# Patient Record
(demographics unavailable — no encounter records)

---

## 2025-05-28 NOTE — DATA REVIEWED
[de-identified] : Type As leah, AU. Testing via inserts:  - 8000Hz AU. Recs: 1) F/u w/ MD 2) Annual

## 2025-05-28 NOTE — REASON FOR VISIT
[Subsequent Evaluation] : a subsequent evaluation for [FreeTextEntry2] : f/u for left ear  infection

## 2025-05-28 NOTE — PROCEDURE
[Risk and Benefits Discussed] : The purpose, risks, discomforts, benefits and alternatives of the procedure have been explained to the patient including no treatment. [Same] : same as the Pre Op Dx. [] : Binocular Microscopy [FreeTextEntry1] : Follow up left OE with debris in ear canal - improved from last visit  [FreeTextEntry4] : NONE [FreeTextEntry6] : Mod debris suctioned from eac and tm - tm somewhat inflammed - not fully visualized - rolands powder instilled

## 2025-05-28 NOTE — REVIEW OF SYSTEMS
[Ear Drainage] : ear drainage [Negative] : Heme/Lymph [Ear Pain] : no ear pain [Recurrent Ear Infections] : no recurrent ear infections [Ear Noises] : no ear noises [de-identified] : pt stated  need refill of ofloxacin  ear drops

## 2025-05-28 NOTE — HISTORY OF PRESENT ILLNESS
[de-identified] : Patient here for follow up of left OE.  Was on ofloxacin drops and dry ear precautions.

## 2025-05-28 NOTE — ASSESSMENT
[FreeTextEntry1] : Patient here for follow up of left OE - infection clearing.  Ear debrided under scope and rolands powder instilled - audio and tymp normal - recommended dry ear precautions again and follow up 2-3 weeks.

## 2025-05-28 NOTE — PHYSICAL EXAM
[Midline] : trachea located in midline position [Normal] : no rashes [de-identified] : right normal ; left ear -  debris in ear canal - see maryann [de-identified] : right normal - debris on tm

## 2025-06-24 NOTE — ASSESSMENT
[FreeTextEntry1] : Patient slowly improving - sl inflammation still present in left eac - ear debrided under scope and rolands powder instilled  - continue dry ear precatutions and followup 2 weeks

## 2025-06-24 NOTE — PROCEDURE
[Risk and Benefits Discussed] : The purpose, risks, discomforts, benefits and alternatives of the procedure have been explained to the patient including no treatment. [Same] : same as the Pre Op Dx. [] : Binocular Microscopy [FreeTextEntry1] : follow up left OE  [FreeTextEntry4] : NONE [FreeTextEntry6] : sl debris suctioned  left ear - sl inflammation and sl damp eac  - rolands powder instilled

## 2025-06-24 NOTE — PHYSICAL EXAM
[de-identified] : right normal ; left ear -  sl debris in ear canal - see maryann [de-identified] : right normal - debris on tm  [Midline] : trachea located in midline position [Normal] : no rashes

## 2025-06-24 NOTE — REVIEW OF SYSTEMS
[Ear Pain] : ear pain [Negative] : Heme/Lymph [Hearing Loss] : no hearing loss [Ear Drainage] : no ear drainage [Ear Noises] : no ear noises [de-identified] : left ear pain